# Patient Record
Sex: MALE | Race: WHITE | Employment: UNEMPLOYED | URBAN - METROPOLITAN AREA
[De-identification: names, ages, dates, MRNs, and addresses within clinical notes are randomized per-mention and may not be internally consistent; named-entity substitution may affect disease eponyms.]

---

## 2020-03-23 ENCOUNTER — HOSPITAL ENCOUNTER (EMERGENCY)
Age: 25
Discharge: PSYCHIATRIC OR SUBSTANCE USE FACILITY | End: 2020-03-23
Attending: EMERGENCY MEDICINE
Payer: COMMERCIAL

## 2020-03-23 VITALS
SYSTOLIC BLOOD PRESSURE: 155 MMHG | RESPIRATION RATE: 18 BRPM | HEIGHT: 75 IN | DIASTOLIC BLOOD PRESSURE: 99 MMHG | WEIGHT: 265 LBS | OXYGEN SATURATION: 98 % | BODY MASS INDEX: 32.95 KG/M2 | HEART RATE: 89 BPM | TEMPERATURE: 98 F

## 2020-03-23 LAB
ACETAMINOPHEN LEVEL: <5 UG/ML (ref 15–30)
ALBUMIN SERPL-MCNC: 4.9 GM/DL (ref 3.4–5)
ALCOHOL SCREEN SERUM: NORMAL %WT/VOL
ALP BLD-CCNC: 127 IU/L (ref 40–129)
ALT SERPL-CCNC: 38 U/L (ref 10–40)
AMPHETAMINES: NEGATIVE
ANION GAP SERPL CALCULATED.3IONS-SCNC: 15 MMOL/L (ref 4–16)
AST SERPL-CCNC: 24 IU/L (ref 15–37)
BARBITURATE SCREEN URINE: NEGATIVE
BASOPHILS ABSOLUTE: 0 K/CU MM
BASOPHILS RELATIVE PERCENT: 0.3 % (ref 0–1)
BENZODIAZEPINE SCREEN, URINE: NEGATIVE
BILIRUB SERPL-MCNC: 2.5 MG/DL (ref 0–1)
BUN BLDV-MCNC: 15 MG/DL (ref 6–23)
CALCIUM SERPL-MCNC: 10.3 MG/DL (ref 8.3–10.6)
CANNABINOID SCREEN URINE: NEGATIVE
CHLORIDE BLD-SCNC: 100 MMOL/L (ref 99–110)
CO2: 26 MMOL/L (ref 21–32)
COCAINE METABOLITE: NEGATIVE
CREAT SERPL-MCNC: 0.7 MG/DL (ref 0.9–1.3)
DIFFERENTIAL TYPE: ABNORMAL
DOSE AMOUNT: ABNORMAL
DOSE AMOUNT: ABNORMAL
DOSE TIME: ABNORMAL
DOSE TIME: ABNORMAL
EOSINOPHILS ABSOLUTE: 0.1 K/CU MM
EOSINOPHILS RELATIVE PERCENT: 0.9 % (ref 0–3)
GFR AFRICAN AMERICAN: >60 ML/MIN/1.73M2
GFR NON-AFRICAN AMERICAN: >60 ML/MIN/1.73M2
GLUCOSE BLD-MCNC: 110 MG/DL (ref 70–99)
HCT VFR BLD CALC: 48 % (ref 42–52)
HEMOGLOBIN: 16.2 GM/DL (ref 13.5–18)
IMMATURE NEUTROPHIL %: 0.1 % (ref 0–0.43)
LITHIUM LEVEL: 0.5 MMOL/L (ref 0.6–1.2)
LYMPHOCYTES ABSOLUTE: 2.2 K/CU MM
LYMPHOCYTES RELATIVE PERCENT: 23.5 % (ref 24–44)
MCH RBC QN AUTO: 28.5 PG (ref 27–31)
MCHC RBC AUTO-ENTMCNC: 33.8 % (ref 32–36)
MCV RBC AUTO: 84.4 FL (ref 78–100)
MONOCYTES ABSOLUTE: 0.6 K/CU MM
MONOCYTES RELATIVE PERCENT: 6.9 % (ref 0–4)
OPIATES, URINE: NEGATIVE
OXYCODONE: NORMAL
PDW BLD-RTO: 12.9 % (ref 11.7–14.9)
PHENCYCLIDINE, URINE: NEGATIVE
PLATELET # BLD: 277 K/CU MM (ref 140–440)
PMV BLD AUTO: 9.5 FL (ref 7.5–11.1)
POTASSIUM SERPL-SCNC: 3.8 MMOL/L (ref 3.5–5.1)
RBC # BLD: 5.69 M/CU MM (ref 4.6–6.2)
SALICYLATE LEVEL: <0.3 MG/DL (ref 15–30)
SEGMENTED NEUTROPHILS ABSOLUTE COUNT: 6.3 K/CU MM
SEGMENTED NEUTROPHILS RELATIVE PERCENT: 68.3 % (ref 36–66)
SODIUM BLD-SCNC: 141 MMOL/L (ref 135–145)
TOTAL IMMATURE NEUTOROPHIL: 0.01 K/CU MM
TOTAL PROTEIN: 7.9 GM/DL (ref 6.4–8.2)
TSH HIGH SENSITIVITY: 3.32 UIU/ML (ref 0.27–4.2)
WBC # BLD: 9.2 K/CU MM (ref 4–10.5)

## 2020-03-23 PROCEDURE — G0638 HC ALCOHOL: HCPCS

## 2020-03-23 PROCEDURE — 80053 COMPREHEN METABOLIC PANEL: CPT

## 2020-03-23 PROCEDURE — 84443 ASSAY THYROID STIM HORMONE: CPT

## 2020-03-23 PROCEDURE — 80307 DRUG TEST PRSMV CHEM ANLYZR: CPT

## 2020-03-23 PROCEDURE — 99285 EMERGENCY DEPT VISIT HI MDM: CPT

## 2020-03-23 PROCEDURE — G0640 HC SALICYLATES: HCPCS

## 2020-03-23 PROCEDURE — G0480 DRUG TEST DEF 1-7 CLASSES: HCPCS

## 2020-03-23 PROCEDURE — 85025 COMPLETE CBC W/AUTO DIFF WBC: CPT

## 2020-03-23 PROCEDURE — 80178 ASSAY OF LITHIUM: CPT

## 2020-03-23 RX ORDER — OLANZAPINE 10 MG/1
10 TABLET ORAL NIGHTLY
COMMUNITY
End: 2020-04-15

## 2020-03-23 RX ORDER — LITHIUM CARBONATE 300 MG
300 TABLET ORAL 3 TIMES DAILY
COMMUNITY

## 2020-03-23 RX ORDER — HYDROXYZINE 50 MG/1
50 TABLET, FILM COATED ORAL 3 TIMES DAILY PRN
COMMUNITY
End: 2020-04-15

## 2020-03-23 ASSESSMENT — SLEEP AND FATIGUE QUESTIONNAIRES
DO YOU USE A SLEEP AID: NO
AVERAGE NUMBER OF SLEEP HOURS: 7
DO YOU HAVE DIFFICULTY SLEEPING: NO

## 2020-03-23 NOTE — ED NOTES
Patient in room. Sitting in chair. Drinking water. No signs of distress.  This nurse monitoring patient 1:1.      Pauly Arias, RN  03/23/20 3408

## 2020-03-23 NOTE — ED NOTES
Margarita with [employer] called with placement at [facility]. Accepting physician Dr. Alpa Monahan.  Room placement will be on arrival. Report number: [phone] option 4.      Lenny Piña RN  03/23/20 3713

## 2020-03-23 NOTE — ED PROVIDER NOTES
Triage Chief Complaint:   Mental Health Problem and Suicidal      [place]:  Jacobo Maurer is a 27 y.o. male that presents to the emergency department with suicidal comments and manic episode. Patient states he has not slept in 5 days. States he does have a history of mental health disorder and was hospitalized approximately a month ago in the [facility]. States he takes medications including lithium. States he feels like the lithium helps the depression but does not help the delvin. States he is taking other 2 medications, Zyprexa and hydroxyzine. States he does see a counselor. Lives in an apartment by himself. States he was laid off and getting evicted from his apartment. Was noted to be cleaning a shotgun tonight and making comments about he does not care if he lives or dies. Neighbor got concerned and called .  brought patient in pn pink slip. He is denying any homicidal ideation. He has extreme flight of ideas    Past Medical History:   Diagnosis Date    ADHD     Bipolar affective ([facility] [address]     Meningitis      History reviewed. No pertinent surgical history. History reviewed. No pertinent family history.   Social History     Socioeconomic History    Marital status: Single     Spouse name: Not on file    Number of children: Not on file    Years of education: Not on file    Highest education level: Not on file   Occupational History    Not on file   Social Needs    Financial resource strain: Not on file    Food insecurity     Worry: Not on file     Inability: Not on file    Transportation needs     Medical: Not on file     Non-medical: Not on file   Tobacco Use    Smoking status: Never Smoker    Smokeless tobacco: Never Used   Substance and Sexual Activity    Alcohol use: Not Currently    Drug use: Yes     Types: Marijuana    Sexual activity: Not on file   Lifestyle    Physical activity     Days per week: Not on file     Minutes per session: Not on file    Stress: Not on file   Relationships    Social connections     Talks on phone: Not on file     Gets together: Not on file     Attends Anabaptist service: Not on file     Active member of club or organization: Not on file     Attends meetings of clubs or organizations: Not on file     Relationship status: Not on file    Intimate partner violence     Fear of current or ex partner: Not on file     Emotionally abused: Not on file     Physically abused: Not on file     Forced sexual activity: Not on file   Other Topics Concern    Not on file   Social History Narrative    Not on file     No current facility-administered medications for this encounter. Current Outpatient Medications   Medication Sig Dispense Refill    lithium 300 MG tablet Take 300 mg by mouth 3 times daily      OLANZapine (ZYPREXA) 10 MG tablet Take 10 mg by mouth nightly      hydrOXYzine (ATARAX) 50 MG tablet Take 50 mg by mouth 3 times daily as needed for Itching       Allergies   Allergen Reactions    Codeine     Pcn [Penicillins]      Nursing Notes Reviewed    ROS:  Per HPI     Physical Exam:  ED Triage Vitals   Enc Vitals Group      BP       Pulse       Resp       Temp       Temp src       SpO2       Weight       Height       Head Circumference       Peak Flow       Pain Score       Pain Loc       Pain Edu? Excl. in [address]? My pulse oximetry interpretation is which is within the normal range    GENERAL APPEARANCE: Awake and alert. Cooperative. No acute distress. HEAD: Normocephalic. Atraumatic. EYES: EOM's grossly intact. Sclera anicteric. ENT: Mucous membranes are moist. Tolerates saliva. No trismus. NECK: Supple. No meningismus. Trachea midline. HEART: RRR. Radial pulses 2+. LUNGS: Respirations unlabored. CTAB  ABDOMEN: Soft. Non-tender. No guarding or rebound. EXTREMITIES: No acute deformities. SKIN: Warm and dry. NEUROLOGICAL: No gross facial drooping. Moves all 4 extremities spontaneously. PSYCHIATRIC: Manic. Flight of ideas. Pressured speech. Denying homicidal ideation. WIll not deny suicidal ideation.      I have reviewed and interpreted all of the currently available lab results from this visit (if applicable):  Results for orders placed or performed during the hospital encounter of 03/23/20   CBC with Auto Diff   Result Value Ref Range    WBC 9.2 4.0 - 10.5 K/CU MM    RBC 5.69 4.6 - 6.2 M/CU MM    Hemoglobin 16.2 13.5 - 18.0 GM/DL    Hematocrit 48.0 42 - 52 %    MCV 84.4 78 - 100 FL    MCH 28.5 27 - 31 PG    MCHC 33.8 32.0 - 36.0 %    RDW 12.9 11.7 - 14.9 %    Platelets 024 852 - 619 K/CU MM    MPV 9.5 7.5 - 11.1 FL    Differential Type AUTOMATED DIFFERENTIAL     Segs Relative 68.3 (H) 36 - 66 %    Lymphocytes % 23.5 (L) 24 - 44 %    Monocytes % 6.9 (H) 0 - 4 %    Eosinophils % 0.9 0 - 3 %    Basophils % 0.3 0 - 1 %    Segs Absolute 6.3 K/CU MM    Lymphocytes Absolute 2.2 K/CU MM    Monocytes Absolute 0.6 K/CU MM    Eosinophils Absolute 0.1 K/CU MM    Basophils Absolute 0.0 K/CU MM    Immature Neutrophil % 0.1 0 - 0.43 %    Total Immature Neutrophil 0.01 K/CU MM   CMP   Result Value Ref Range    Sodium 141 135 - 145 MMOL/L    Potassium 3.8 3.5 - 5.1 MMOL/L    Chloride 100 99 - 110 mMol/L    CO2 26 21 - 32 MMOL/L    BUN 15 6 - 23 MG/DL    CREATININE 0.7 (L) 0.9 - 1.3 MG/DL    Glucose 110 (H) 70 - 99 MG/DL    Calcium 10.3 8.3 - 10.6 MG/DL    Alb 4.9 3.4 - 5.0 GM/DL    Total Protein 7.9 6.4 - 8.2 GM/DL    Total Bilirubin 2.5 (H) 0.0 - 1.0 MG/DL    ALT 38 10 - 40 U/L    AST 24 15 - 37 IU/L    Alkaline Phosphatase 127 40 - 129 IU/L    GFR Non-African American >60 >60 mL/min/1.73m2    GFR African American >60 >60 mL/min/1.73m2    Anion Gap 15 4 - 16   TSH without Reflex   Result Value Ref Range    TSH, High Sensitivity 3.320 0.270 - 4.20 uIu/ml   Urine Drug Screen   Result Value Ref Range    Cannabinoid Scrn, Ur NEGATIVE NEGATIVE    Amphetamines NEGATIVE NEGATIVE    Cocaine Metabolite NEGATIVE NEGATIVE    Benzodiazepine Screen, Urine NEGATIVE NEGATIVE    Barbiturate Screen, Ur NEGATIVE NEGATIVE    Opiates, Urine NEGATIVE NEGATIVE    Phencyclidine, Urine NEGATIVE NEGATIVE    Oxycodone  NEGATIVE     NEGATIVE          THRESHOLD CONCENTRATIONS (mg/dL)  AMPHT               1000  MARYAN,OPIA             300  BZO,BAR              200  PCP                   25  THC                   50  OXY                  100          IF POSITIVE, SPECIMEN WILL BE  DISCARDED AFTER 6 MONTHS. CALL LAB IF CONFIRMATION NEEDED. ALL NEGATIVE SPECIMENS WILL BE  DISCARDED AFTER ONE WEEK. * UNCONFIRMED POSITIVES MAY  NOT MEET FORENSIC REQUIREMENTS. Ethanol Level   Result Value Ref Range    Alcohol Scrn <0.01  THE VALUE IS BELOW OUR DETECTION LIMIT. <8.98 %WT/VOL   Salicylate Level   Result Value Ref Range    Salicylate Lvl <1.4 (L) 15 - 30 MG/DL    DOSE AMOUNT DOSE AMT. GIVEN - NA     DOSE TIME DOSE TIME GIVEN - NA    Acetaminophen Level   Result Value Ref Range    Acetaminophen Level <5.0 (L) 15 - 30 ug/ml    DOSE AMOUNT DOSE AMT. GIVEN - NA     DOSE TIME DOSE TIME GIVEN - NA    Lithium Level   Result Value Ref Range    Lithium Lvl 0.5 (L) 0.6 - 1.2 MMOL/L        Radiographs:  [] Radiologist's Wet Read Report Reviewed:     [] Discussed with Radiologist:     [] The following radiograph was interpreted by myself in the absence of a radiologist:     EKG: (All EKG's are interpreted by myself in the absence of a cardiologist)      MDM:  Patient has been pink slipped. Placed on suicide precautions. CBC is within normal limits. Electrolytes normal.  TSH normal.  Alcohol, salicylate, and Tylenol levels are all normal.  Patient's lithium level is subtherapeutic at 0.5. Urine drug screen is negative. Patient is medically cleared. [facility] patient with [facility]. [facility]. I agree with this. [facility] at this time. 4:07am- Accepted at [facility], Dr Catracho Richardson. Awaiting transport. Clinical Impression:  1. Suicidal ideation    2. Manic behavior ([facility] [address]        Disposition Vitals:  [unfilled], [unfilled], [unfilled], [unfilled]    Disposition referral (if applicable):  No follow-up provider specified.     Disposition medications (if applicable):  New Prescriptions    No medications on file         (Please note that portions of this note may have been completed with a voice recognition program. Efforts were made to edit the dictations but occasionally words are mis-transcribed.)    MD Baljinder Deluca MD  03/23/20 0751

## 2020-03-23 NOTE — ED NOTES
Patient reports his loves listening to Heavy Mental and singing the lyrics real loud. Patient reports he had a gun when he was 18years old but he buried it somewhere in [place] but reports he doesn't know/ remember where.      Roger Mascorro RN  03/23/20 83 RENEA Mulligan RN  03/23/20 1011

## 2020-03-23 NOTE — ED NOTES
Telepysch completed. Patient requesting chips. Chips and Rice crispy treat provided to patient. Patient standing in room eating. Remains calm, cooperative. No signs of distress.  This nurse continues to monitor patient 1:1.     Zenaida Cevallos RN  03/23/20 7197

## 2020-03-23 NOTE — ED NOTES
Med trans eta 30 minutes     [facility]  03/23/20 4074

## 2020-03-23 NOTE — ED NOTES
Patient attempted urine sample without success. Susi Kim RN with patient during urine attempt.      Cleveland Hough RN  03/23/20 7448 Quirino Hart RN  03/23/20 6278

## 2020-03-23 NOTE — ED NOTES
LAVELLE contacted. Will provide call back as soon as possible.       Linda Leonard RN  03/23/20 5486

## 2020-03-23 NOTE — ED NOTES
Urine specimen collected. Patient back in room.  This nurse continues to monitor patient 1:1.      Glen Jo RN  03/23/20 4429

## 2020-03-23 NOTE — ED NOTES
Patient in standing room. No signs of distress.  [facility].      Zenaida Cevallos RN  03/23/20 9922

## 2020-03-23 NOTE — ED NOTES
Patient in room. This nurse monitoring patient 1:1. No signs of distress. Patient continuously talking to this nurse. Flight of ideas.       Jennifer Martinez RN  03/23/20 6021

## 2020-03-23 NOTE — PROGRESS NOTES
Provisional Diagnosis:    Unspecified Mood Disorder    Risk, Psychosocial and Contextual Factors:      Current MH Treatment:  [facility], appointment on march 30th    Present Suicidal Behavior:      Verbal:   Denies        Attempt:  Denies    Access to Weapons:  Denies    Current Suicide Risk: Low, Moderate or High:    High    Past Suicidal Behavior:       Verbal:   Yes    Attempt:  Denies    Self-Injurious/Self-Mutilation: Denies    Traumatic Event Within Past 2 Weeks: In and out of work     Current Abuse:  Denies    Legal:    Denies    Violence: In the past    Protective Factors:   Music     Housing:      Lives alone in apartment    CPAP/Oxygen/Ambulation Difficulties: Denies    Clinical Summary:      Patient is a 27year old male who presents to the [facility] ED on [facility] via reporting. Tele-Health completed by this clinician via iPad. Per physician note, patient has not slept in 5 days. Patient presented to the ED by the . Patient was reportedly cleaning a shotgun tonight and stating he does not care if he lives or dies. Patient reports to this clinician, 'I like living I bottle up my emotions'. Patient states 'I was singing in the shower heavy metal and I guess my neighbors heard and called the police'. Patient denies present suicidal ideation or plan. Patient reports previous suicidal thoughts as an adolescent. Patient states 'I flirted with death before the age of 27'. Patient reports history of depression. Patient denies recent symptoms. Patient reports receiving outpatient services for mental health treatment. Homicidal thoughts and/or plans denied. No delusions noted. Hallucinations denied. AOD denied. Patient flight of ideas throughout assessment. Patient manic at this time. Patient alert and oriented x4. Level of Care Disposition:      Consulted with medical provider Dr. Lory Lopez. Patient is medically cleared. Patient to be admitted for inpatient treatment. [facility] seeking placement.

## 2020-03-23 NOTE — ED NOTES
Patient in standing in room. No signs of distress. Talking with this nurse.  This nurse monitoring patient 1:1.      Sudheer Flores RN  03/23/20 5761

## 2020-03-23 NOTE — ED NOTES
Telepsych on with patient. Patient cooperative at this time. Continues to deny suicidal thoughts.      Pilar Dallas RN  03/23/20 0505

## 2020-03-23 NOTE — ED NOTES
Report called to [facility] at [facility].      Shiloh Chamberlain RN  03/23/20 9324

## 2020-03-23 NOTE — ED NOTES
Patient reports his past with his father has affected his life.      Irvin Schneider RN  03/23/20 3113

## 2020-03-23 NOTE — ED NOTES
Patient sitting in room. This nurse monitoring patient 1:1. No signs of distress.       Cleveland Hough RN  03/23/20 [address], RN  03/23/20 5673

## 2020-03-23 NOTE — ED NOTES
Pt provided orange juice and multiple cups of water. Pt states he is unable to provide UA at this time.  Pt given freezer meal per request.     Daryle Capri, RN  03/23/20 4822

## 2020-03-23 NOTE — ED NOTES
Bed: E08  Expected date:   Expected time:   Means of arrival:   Comments:  Cata Estrada RN  03/23/20 9566

## 2020-03-23 NOTE — ED NOTES
Patient attempted to urinate without success. Teetee Booker RN with patient. Back in room. Drinking water. Continuous talking.      Stanford Ng, RN  03/23/20 [address] Mulligan RN  03/23/20 1200

## 2020-03-23 NOTE — ED NOTES
Patient reports hanging himself when he was 18years old because his girlfriend left him.       Pauly Arias, NAHOMY  03/23/20 4244

## 2020-03-23 NOTE — ED NOTES
Patient to ED via Police Department due to suicidal comments reported by neighbor. PINK SLIP states: \" Received a call for a male at the complex of [address]. The anonymous caller advised dispatch the the male was making comments about a shotgun and he didn't care if he . We made contact w/ the male and he did admit he was having issues w/ Mental Health. He currently taking 3 different medications for Mental Health problem. He agreed that he wanted to be checked out. He was brought to the hospital for further evaluation. Made several comments of dying and manic episodes. He told me that the comments were mainly from a song and he didn't mean it. \" By deputy number 80. Patient denies suicidal/ homicidal thoughts and states \"I am in a manic episode, I take my medicine everyday. \" Patient talking continuously. Patient belongings collected by security Janie Beech) inventoried (see Patient Belongings section in [facility] [address] [facility]). Green gown applied, socks, underwear given.     Sudheer Flores RN  20 [place]  20 [place]  20 6461

## 2020-03-23 NOTE — ED NOTES
Patient states he attempted \"death\" between 18 years til 25years old at least 5 times.      Pino Damon RN  03/23/20 4002

## 2020-03-24 ENCOUNTER — HOSPITAL ENCOUNTER (OUTPATIENT)
Age: 25
Setting detail: SPECIMEN
Discharge: HOME | End: 2020-03-24

## 2020-03-24 LAB
CHOLESTEROL: 133 MG/DL
HDLC SERPL-MCNC: 37 MG/DL
LDL CHOLESTEROL DIRECT: 98 MG/DL
LITHIUM LEVEL: 0.5 MMOL/L (ref 0.6–1.2)
TRIGL SERPL-MCNC: 57 MG/DL
TSH HIGH SENSITIVITY: 1.99 UIU/ML (ref 0.27–4.2)

## 2020-03-24 PROCEDURE — 80178 ASSAY OF LITHIUM: CPT

## 2020-03-24 PROCEDURE — 36415 COLL VENOUS BLD VENIPUNCTURE: CPT

## 2020-03-24 PROCEDURE — 80061 LIPID PANEL: CPT

## 2020-03-24 PROCEDURE — 84443 ASSAY THYROID STIM HORMONE: CPT

## 2020-03-24 PROCEDURE — 83721 ASSAY OF BLOOD LIPOPROTEIN: CPT

## 2020-03-27 ENCOUNTER — HOSPITAL ENCOUNTER (OUTPATIENT)
Age: 25
Setting detail: SPECIMEN
Discharge: HOME | End: 2020-03-27
Payer: COMMERCIAL

## 2020-03-27 LAB — LITHIUM LEVEL: 0.5 MMOL/L (ref 0.6–1.2)

## 2020-03-27 PROCEDURE — 36415 COLL VENOUS BLD VENIPUNCTURE: CPT

## 2020-03-27 PROCEDURE — 80178 ASSAY OF LITHIUM: CPT

## 2020-04-15 ENCOUNTER — HOSPITAL ENCOUNTER (EMERGENCY)
Age: 25
Discharge: HOME | End: 2020-04-15
Attending: EMERGENCY MEDICINE
Payer: COMMERCIAL

## 2020-04-15 VITALS
SYSTOLIC BLOOD PRESSURE: 147 MMHG | WEIGHT: 270 LBS | RESPIRATION RATE: 20 BRPM | BODY MASS INDEX: 34.65 KG/M2 | HEIGHT: 74 IN | TEMPERATURE: 97.6 F | OXYGEN SATURATION: 98 % | DIASTOLIC BLOOD PRESSURE: 92 MMHG | HEART RATE: 88 BPM

## 2020-04-15 DIAGNOSIS — Z59.00 HOMELESS: ICD-10-CM

## 2020-04-15 DIAGNOSIS — F32.A DEPRESSION, UNSPECIFIED DEPRESSION TYPE: Primary | ICD-10-CM

## 2020-04-15 LAB
ACETAMINOPHEN LEVEL: <5 UG/ML (ref 15–30)
ALBUMIN SERPL-MCNC: 4.5 GM/DL (ref 3.4–5)
ALCOHOL SCREEN SERUM: NORMAL %WT/VOL
ALP BLD-CCNC: 108 IU/L (ref 40–129)
ALT SERPL-CCNC: 20 U/L (ref 10–40)
AMPHETAMINES: NEGATIVE
ANION GAP SERPL CALCULATED.3IONS-SCNC: 11 MMOL/L (ref 4–16)
AST SERPL-CCNC: 20 IU/L (ref 15–37)
BARBITURATE SCREEN URINE: NEGATIVE
BASOPHILS ABSOLUTE: 0.1 K/CU MM
BASOPHILS RELATIVE PERCENT: 0.7 % (ref 0–1)
BENZODIAZEPINE SCREEN, URINE: NEGATIVE
BILIRUB SERPL-MCNC: 1.5 MG/DL (ref 0–1)
BUN BLDV-MCNC: 10 MG/DL (ref 6–23)
CALCIUM SERPL-MCNC: 9.4 MG/DL (ref 8.3–10.6)
CANNABINOID SCREEN URINE: NEGATIVE
CHLORIDE BLD-SCNC: 99 MMOL/L (ref 99–110)
CO2: 26 MMOL/L (ref 21–32)
COCAINE METABOLITE: NEGATIVE
CREAT SERPL-MCNC: 0.7 MG/DL (ref 0.9–1.3)
DIFFERENTIAL TYPE: ABNORMAL
DOSE AMOUNT: ABNORMAL
DOSE AMOUNT: ABNORMAL
DOSE TIME: ABNORMAL
DOSE TIME: ABNORMAL
EOSINOPHILS ABSOLUTE: 0.2 K/CU MM
EOSINOPHILS RELATIVE PERCENT: 1.8 % (ref 0–3)
GFR AFRICAN AMERICAN: >60 ML/MIN/1.73M2
GFR NON-AFRICAN AMERICAN: >60 ML/MIN/1.73M2
GLUCOSE BLD-MCNC: 156 MG/DL (ref 70–99)
HCT VFR BLD CALC: 46.3 % (ref 42–52)
HEMOGLOBIN: 15.4 GM/DL (ref 13.5–18)
IMMATURE NEUTROPHIL %: 0.4 % (ref 0–0.43)
LYMPHOCYTES ABSOLUTE: 2.2 K/CU MM
LYMPHOCYTES RELATIVE PERCENT: 25.1 % (ref 24–44)
MCH RBC QN AUTO: 28.9 PG (ref 27–31)
MCHC RBC AUTO-ENTMCNC: 33.3 % (ref 32–36)
MCV RBC AUTO: 86.9 FL (ref 78–100)
MONOCYTES ABSOLUTE: 0.5 K/CU MM
MONOCYTES RELATIVE PERCENT: 5.8 % (ref 0–4)
NUCLEATED RBC %: 0 %
OPIATES, URINE: NEGATIVE
OXYCODONE: NORMAL
PDW BLD-RTO: 13.6 % (ref 11.7–14.9)
PHENCYCLIDINE, URINE: NEGATIVE
PLATELET # BLD: 295 K/CU MM (ref 140–440)
PMV BLD AUTO: 9.3 FL (ref 7.5–11.1)
POTASSIUM SERPL-SCNC: 4.2 MMOL/L (ref 3.5–5.1)
RBC # BLD: 5.33 M/CU MM (ref 4.6–6.2)
SALICYLATE LEVEL: <0.3 MG/DL (ref 15–30)
SEGMENTED NEUTROPHILS ABSOLUTE COUNT: 5.7 K/CU MM
SEGMENTED NEUTROPHILS RELATIVE PERCENT: 66.2 % (ref 36–66)
SODIUM BLD-SCNC: 136 MMOL/L (ref 135–145)
TOTAL IMMATURE NEUTOROPHIL: 0.03 K/CU MM
TOTAL NUCLEATED RBC: 0 K/CU MM
TOTAL PROTEIN: 7 GM/DL (ref 6.4–8.2)
TSH HIGH SENSITIVITY: 1.16 UIU/ML (ref 0.27–4.2)
WBC # BLD: 8.6 K/CU MM (ref 4–10.5)

## 2020-04-15 PROCEDURE — 80053 COMPREHEN METABOLIC PANEL: CPT

## 2020-04-15 PROCEDURE — G0480 DRUG TEST DEF 1-7 CLASSES: HCPCS

## 2020-04-15 PROCEDURE — G6040 ASSAY OF ETHANOL: HCPCS

## 2020-04-15 PROCEDURE — 99284 EMERGENCY DEPT VISIT MOD MDM: CPT

## 2020-04-15 PROCEDURE — 84443 ASSAY THYROID STIM HORMONE: CPT

## 2020-04-15 PROCEDURE — 80307 DRUG TEST PRSMV CHEM ANLYZR: CPT

## 2020-04-15 PROCEDURE — 85025 COMPLETE CBC W/AUTO DIFF WBC: CPT

## 2020-04-15 PROCEDURE — G6038 ASSAY OF SALICYLATE: HCPCS

## 2020-04-15 RX ORDER — HYDROXYZINE PAMOATE 50 MG/1
50 CAPSULE ORAL 3 TIMES DAILY PRN
COMMUNITY

## 2020-04-15 SDOH — ECONOMIC STABILITY - HOUSING INSECURITY: HOMELESSNESS UNSPECIFIED: Z59.00

## 2020-04-15 ASSESSMENT — ENCOUNTER SYMPTOMS
RESPIRATORY NEGATIVE: 1
GASTROINTESTINAL NEGATIVE: 1
ALLERGIC/IMMUNOLOGIC NEGATIVE: 1
EYES NEGATIVE: 1

## 2020-04-15 NOTE — ED NOTES
Patient states that he was an inpatient at [address] one month ago for his delvin. The patient states \"I just don't know what to do\" I just was to get my life together\". The patient states a concern that his cars breaks are bad and he does not want to cause an accident. The patient is calm but very talkative. His affect is somewhat muted. He is repeating safety concerns for his car.      Kanchan Sullivan RN  04/15/20 4824

## 2020-04-15 NOTE — CARE COORDINATION
LIANA - Jayson Ruth in room # 23 speaking with pt on multiple issues that both this LIANA and Varsha Abebe are (have ) been solving for pt assist. Pt is a resident of [facility] --where he lived with his parents --but has been forbidden to return . Pt has had behavioral and MH issues in his past . Pt was seen and cleared per Thad Jack RN -Mental health RN. [facility] and [address] [place] [address] are not open options for pt, nor can any vouchers be given . Called the 2 listed [employer] --came up with this plan working with [place] [facility] [employer] (spoke with Ledy)---PLAN-----  --[facility] has bed capability and requested that they will call him on his cell phone in AM ([phone])---[facility] interfaith number is ([phone]) and is located at [address] --his case will be reviewed tonight and a call will be made in AM to bring him in . They added that [facility] and [employer] are all hiring now . --Pt ok with plan he will be with his car tonight and will make contact in AM . LIANA charged up his cell phone ---information given on all above with discharge . --pt will be discharged when phone is charged from [address] [place] [address].  -Daquan COREA / Varsha Abebe RN --LIANA

## 2020-04-15 NOTE — ED PROVIDER NOTES
[facility]      TRIAGE CHIEF COMPLAINT:   Depression and Suicidal      [place]:  Monika Ziegler is a 27 y.o. male that presents complaint depression, suicidal thoughts. Patient states he has bipolar he has been taking his medication he states for the last 5 days he is been homeless he lost his job lost his housing he is depressed having suicidal thoughts he just states to me that he is saying this in order to get help. He tried a homeless shelter but they are not taking people because of coronavirus. He denies any fever chest pain shortness of breath he denies any plan to hurt himself he is having thoughts because he has lost everything. Patient states again he is saying this in order to get help in the form of housing and food. No other questions or concerns. REVIEW OF SYSTEMS:  At least 10 systems reviewed and otherwise acutely negative except as in the [address] Review of Systems   Constitutional: Negative. HENT: Negative. Eyes: Negative. Respiratory: Negative. Cardiovascular: Negative. Gastrointestinal: Negative. Endocrine: Negative. Genitourinary: Negative. Musculoskeletal: Negative. Skin: Negative. Allergic/Immunologic: Negative. Neurological: Negative. Hematological: Negative. Psychiatric/Behavioral: Positive for dysphoric mood, sleep disturbance and suicidal ideas. The patient is nervous/anxious. All other systems reviewed and are negative. Past Medical History:   Diagnosis Date    ADHD     Bipolar affective ([address]     Meningitis      No past surgical history on file. No family history on file.   Social History     Socioeconomic History    Marital status: Single     Spouse name: Not on file    Number of children: Not on file    Years of education: Not on file    Highest education level: Not on file   Occupational History    Not on file   Social Needs    Financial resource strain: Not on file    Food insecurity     Worry: Not on file     Inability: Not on file    Transportation needs     Medical: Not on file     Non-medical: Not on file   Tobacco Use    Smoking status: Never Smoker    Smokeless tobacco: Never Used   Substance and Sexual Activity    Alcohol use: Not Currently    Drug use: Yes     Types: Marijuana    Sexual activity: Not on file   Lifestyle    Physical activity     Days per week: Not on file     Minutes per session: Not on file    Stress: Not on file   Relationships    Social connections     Talks on phone: Not on file     Gets together: Not on file     Attends Mandaen service: Not on file     Active member of club or organization: Not on file     Attends meetings of clubs or organizations: Not on file     Relationship status: Not on file    Intimate partner violence     Fear of current or ex partner: Not on file     Emotionally abused: Not on file     Physically abused: Not on file     Forced sexual activity: Not on file   Other Topics Concern    Not on file   Social History Narrative    Not on file     No current facility-administered medications for this encounter. Current Outpatient Medications   Medication Sig Dispense Refill    hydrOXYzine (VISTARIL) 50 MG capsule Take 50 mg by mouth 3 times daily as needed for Itching      Paliperidone Palmitate (INVEGA TRINZA IM) Inject into the muscle      lithium 300 MG tablet Take 300 mg by mouth 3 times daily        Allergies   Allergen Reactions    Codeine     Pcn [Penicillins]      No current facility-administered medications for this encounter.       Current Outpatient Medications   Medication Sig Dispense Refill    hydrOXYzine (VISTARIL) 50 MG capsule Take 50 mg by mouth 3 times daily as needed for Itching      Paliperidone Palmitate (INVEGA TRINZA IM) Inject into the muscle      lithium 300 MG tablet Take 300 mg by mouth 3 times daily         Nursing Notes Reviewed    VITAL SIGNS:  ED Triage Vitals   Enc Vitals Group      BP       Pulse       Resp Temp       Temp src       SpO2       Weight       Height       Head Circumference       Peak Flow       Pain Score       Pain Loc       Pain Edu? Excl. in [address]? PHYSICAL EXAM:  Physical Exam  Vitals signs and nursing note reviewed. Constitutional:       General: He is not in acute distress. Appearance: Normal appearance. He is not ill-appearing, toxic-appearing or diaphoretic. HENT:      Head: Normocephalic and atraumatic. Right Ear: External ear normal.      Left Ear: External ear normal.      Nose: No congestion or rhinorrhea. Mouth/Throat:      Mouth: Mucous membranes are moist.      Pharynx: No oropharyngeal exudate or posterior oropharyngeal erythema. Eyes:      General: No scleral icterus. Right eye: No discharge. Left eye: No discharge. Extraocular Movements: Extraocular movements intact. Conjunctiva/sclera: Conjunctivae normal.   Neck:      Musculoskeletal: Normal range of motion. No neck rigidity. Cardiovascular:      Rate and Rhythm: Normal rate and regular rhythm. Pulses: Normal pulses. Heart sounds: Normal heart sounds. No murmur. No friction rub. No gallop. Pulmonary:      Effort: Pulmonary effort is normal. No respiratory distress. Breath sounds: Normal breath sounds. No stridor. No wheezing, rhonchi or rales. Abdominal:      General: Bowel sounds are normal. There is no distension. Palpations: Abdomen is soft. There is no mass. Tenderness: There is no abdominal tenderness. There is no guarding or rebound. Hernia: No hernia is present. Musculoskeletal: Normal range of motion. General: No swelling, tenderness, deformity or signs of injury. Right lower leg: No edema. Left lower leg: No edema. Skin:     General: Skin is warm. Coloration: Skin is not jaundiced or pale. Findings: No bruising, erythema, lesion or rash. Neurological:      General: No focal deficit present. Mental Status: He is alert and oriented to person, place, and time. GCS: GCS eye subscore is 4. GCS verbal subscore is 5. GCS motor subscore is 6. Cranial Nerves: Cranial nerves are intact. No cranial nerve deficit, dysarthria or facial asymmetry. Sensory: Sensation is intact. No sensory deficit. Motor: Motor function is intact. No weakness, tremor, atrophy, abnormal muscle tone or seizure activity. Coordination: Coordination is intact. Coordination normal.      Gait: Gait normal.   Psychiatric:         Attention and Perception: Attention normal.         Mood and Affect: Mood is depressed. Speech: Speech normal.         Behavior: Behavior normal. Behavior is not agitated, slowed, aggressive, withdrawn, hyperactive or combative. Behavior is cooperative. Thought Content: Thought content normal. Thought content is not paranoid or delusional. Thought content does not include homicidal or suicidal ideation. Thought content does not include homicidal or suicidal plan. Cognition and Memory: Cognition normal.         Judgment: Judgment is impulsive.            I have reviewed andinterpreted all of the currently available lab results from this visit (if applicable):    Results for orders placed or performed during the hospital encounter of 04/15/20   CBC Auto Differential   Result Value Ref Range    WBC 8.6 4.0 - 10.5 K/CU MM    RBC 5.33 4.6 - 6.2 M/CU MM    Hemoglobin 15.4 13.5 - 18.0 GM/DL    Hematocrit 46.3 42 - 52 %    MCV 86.9 78 - 100 FL    MCH 28.9 27 - 31 PG    MCHC 33.3 32.0 - 36.0 %    RDW 13.6 11.7 - 14.9 %    Platelets 427 761 - 878 K/CU MM    MPV 9.3 7.5 - 11.1 FL    Differential Type AUTOMATED DIFFERENTIAL     Segs Relative 66.2 (H) 36 - 66 %    Lymphocytes % 25.1 24 - 44 %    Monocytes % 5.8 (H) 0 - 4 %    Eosinophils % 1.8 0 - 3 %    Basophils % 0.7 0 - 1 %    Segs Absolute 5.7 K/CU MM    Lymphocytes Absolute 2.2 K/CU MM    Monocytes Absolute 0.5 K/CU MM Eosinophils Absolute 0.2 K/CU MM    Basophils Absolute 0.1 K/CU MM    Nucleated RBC % 0.0 %    Total Nucleated RBC 0.0 K/CU MM    Total Immature Neutrophil 0.03 K/CU MM    Immature Neutrophil % 0.4 0 - 0.43 %   CMP   Result Value Ref Range    Sodium 136 135 - 145 MMOL/L    Potassium 4.2 3.5 - 5.1 MMOL/L    Chloride 99 99 - 110 mMol/L    CO2 26 21 - 32 MMOL/L    BUN 10 6 - 23 MG/DL    CREATININE 0.7 (L) 0.9 - 1.3 MG/DL    Glucose 156 (H) 70 - 99 MG/DL    Calcium 9.4 8.3 - 10.6 MG/DL    Alb 4.5 3.4 - 5.0 GM/DL    Total Protein 7.0 6.4 - 8.2 GM/DL    Total Bilirubin 1.5 (H) 0.0 - 1.0 MG/DL    ALT 20 10 - 40 U/L    AST 20 15 - 37 IU/L    Alkaline Phosphatase 108 40 - 129 IU/L    GFR Non-African American >60 >60 mL/min/1.73m2    GFR African American >60 >60 mL/min/1.73m2    Anion Gap 11 4 - 16   TSH without Reflex   Result Value Ref Range    TSH, High Sensitivity 1.160 0.270 - 4.20 uIu/ml   Urine Drug Screen   Result Value Ref Range    Cannabinoid Scrn, Ur NEGATIVE NEGATIVE    Amphetamines NEGATIVE NEGATIVE    Cocaine Metabolite NEGATIVE NEGATIVE    Benzodiazepine Screen, Urine NEGATIVE NEGATIVE    Barbiturate Screen, Ur NEGATIVE NEGATIVE    Opiates, Urine NEGATIVE NEGATIVE    Phencyclidine, Urine NEGATIVE NEGATIVE    Oxycodone  NEGATIVE     NEGATIVE          THRESHOLD CONCENTRATIONS (mg/dL)  AMPHT               1000  MARYAN,OPIA             300  BZO,BAR              200  PCP                   25  THC                   50  OXY                  100          IF POSITIVE, SPECIMEN WILL BE  DISCARDED AFTER 6 MONTHS. CALL LAB IF CONFIRMATION NEEDED. ALL NEGATIVE SPECIMENS WILL BE  DISCARDED AFTER ONE WEEK. * UNCONFIRMED POSITIVES MAY  NOT MEET FORENSIC REQUIREMENTS.             ETOH Blood   Result Value Ref Range    Alcohol Scrn <0.01  THE VALUE IS BELOW OUR DETECTION LIMIT. <7.34 %WT/VOL   Salicylate Level   Result Value Ref Range    Salicylate Lvl <8.2 (L) 15 - 30 MG/DL    DOSE AMOUNT DOSE AMT.  GIVEN - UNKNOWN     DOSE IMPRESSION:  Final diagnoses:   Depression, unspecified depression type   Homeless       (Please note that portions of this note may have been completed with a voice recognition program. Efforts were made to edit the dictations but occasionally words aremis-transcribed.)    DISPOSITION REFERRAL (if applicable):  [facility] Emergency Department  [phone]    If symptoms worsen    [address]  [facility] [id number]  [phone]          [address]  [address] (if applicable):  New Prescriptions    No medications on file          Cristina Reyna, [facility], DO  04/15/20 2059       Cristina Reyna, DO  04/15/20 2122

## 2020-04-15 NOTE — ED NOTES
Patient undressed placed in green gown. Belongings given to security and locked away. RN at bedside awaiting patient monitor.       Emily Dejesus RN  04/15/20 [id number]
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
## 2020-04-15 NOTE — ED NOTES
Patient denies SI/HI states \"I don't want to kill myself, I just don't have anywhere to go\". Patient states his parents live in [place] but he is not wanted there. 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       Kenroy Chen, NAHOMY  04/15/20 8097

## 2020-04-15 NOTE — ED NOTES
Rounding of the patient was done and the patient was calm and talkative in the room. The patient constant observer is at bedside and has no concerns of patient safety. Every 15 minute visual checks continued.      Jessica Otoole RN  04/15/20 3828